# Patient Record
Sex: MALE | Race: WHITE | NOT HISPANIC OR LATINO | Employment: UNEMPLOYED | URBAN - NONMETROPOLITAN AREA
[De-identification: names, ages, dates, MRNs, and addresses within clinical notes are randomized per-mention and may not be internally consistent; named-entity substitution may affect disease eponyms.]

---

## 2021-03-28 ENCOUNTER — HISTORICAL (OUTPATIENT)
Dept: ADMINISTRATIVE | Facility: HOSPITAL | Age: 56
End: 2021-03-28

## 2021-07-18 ENCOUNTER — HISTORICAL (OUTPATIENT)
Dept: ADMINISTRATIVE | Facility: HOSPITAL | Age: 56
End: 2021-07-18

## 2021-08-19 ENCOUNTER — HISTORICAL (OUTPATIENT)
Dept: ADMINISTRATIVE | Facility: HOSPITAL | Age: 56
End: 2021-08-19

## 2023-03-01 ENCOUNTER — HOSPITAL ENCOUNTER (EMERGENCY)
Facility: HOSPITAL | Age: 58
Discharge: HOME | End: 2023-03-01
Payer: MEDICAID

## 2023-03-01 VITALS
OXYGEN SATURATION: 97 % | BODY MASS INDEX: 29.03 KG/M2 | HEIGHT: 73 IN | RESPIRATION RATE: 18 BRPM | SYSTOLIC BLOOD PRESSURE: 99 MMHG | HEART RATE: 65 BPM | WEIGHT: 219 LBS | DIASTOLIC BLOOD PRESSURE: 76 MMHG | TEMPERATURE: 97 F

## 2023-03-01 DIAGNOSIS — R55 SYNCOPE: Primary | ICD-10-CM

## 2023-03-01 DIAGNOSIS — D45 POLYCYTHEMIA VERA: ICD-10-CM

## 2023-03-01 DIAGNOSIS — G93.9 BRAIN LESION: ICD-10-CM

## 2023-03-01 LAB
ALBUMIN SERPL-MCNC: 3.7 G/DL (ref 3.4–5)
ALBUMIN/GLOB SERPL: 1.5 RATIO
ALP SERPL-CCNC: 48 UNIT/L (ref 50–144)
ALT SERPL-CCNC: 17 UNIT/L (ref 1–45)
AMPHET UR QL SCN: NEGATIVE
ANION GAP SERPL CALC-SCNC: 10 MEQ/L (ref 2–13)
APPEARANCE UR: CLEAR
AST SERPL-CCNC: 17 UNIT/L (ref 17–59)
BACTERIA #/AREA URNS AUTO: NORMAL /HPF
BARBITURATE SCN PRESENT UR: NEGATIVE
BASOPHILS # BLD AUTO: 0.06 X10(3)/MCL (ref 0.01–0.08)
BASOPHILS NFR BLD AUTO: 0.6 % (ref 0.1–1.2)
BENZODIAZ UR QL SCN: NEGATIVE
BILIRUB UR QL STRIP.AUTO: NEGATIVE MG/DL
BILIRUBIN DIRECT+TOT PNL SERPL-MCNC: 0.2 MG/DL (ref 0–1)
BUN SERPL-MCNC: 8 MG/DL (ref 7–20)
CALCIUM SERPL-MCNC: 8.3 MG/DL (ref 8.4–10.2)
CANNABINOIDS UR QL SCN: NEGATIVE
CHLORIDE SERPL-SCNC: 106 MMOL/L (ref 98–110)
CO2 SERPL-SCNC: 21 MMOL/L (ref 21–32)
COCAINE UR QL SCN: NEGATIVE
COLOR UR AUTO: YELLOW
CREAT SERPL-MCNC: 0.71 MG/DL (ref 0.66–1.25)
CREAT/UREA NIT SERPL: 11 (ref 12–20)
EOSINOPHIL # BLD AUTO: 0.16 X10(3)/MCL (ref 0.04–0.54)
EOSINOPHIL NFR BLD AUTO: 1.5 % (ref 0.7–7)
ERYTHROCYTE [DISTWIDTH] IN BLOOD BY AUTOMATED COUNT: 12.4 % (ref 11.6–14.4)
ETHANOL BLD-MCNC: 0.11 G/DL
ETHANOL SERPL-MCNC: 110 MG/DL
GFR SERPLBLD CREATININE-BSD FMLA CKD-EPI: >90 MLS/MIN/1.73/M2
GLOBULIN SER-MCNC: 2.4 GM/DL (ref 2–3.9)
GLUCOSE SERPL-MCNC: 96 MG/DL (ref 70–115)
GLUCOSE UR QL STRIP.AUTO: NEGATIVE MG/DL
HCT VFR BLD AUTO: 41.3 % (ref 36–52)
HGB BLD-MCNC: 14.3 G/DL (ref 13–18)
IMM GRANULOCYTES # BLD AUTO: 0.04 X10(3)/MCL (ref 0–0.03)
IMM GRANULOCYTES NFR BLD AUTO: 0.4 % (ref 0–0.5)
KETONES UR QL STRIP.AUTO: NEGATIVE MG/DL
LEUKOCYTE ESTERASE UR QL STRIP.AUTO: NEGATIVE UNIT/L
LYMPHOCYTES # BLD AUTO: 3.13 X10(3)/MCL (ref 1.32–3.57)
LYMPHOCYTES NFR BLD AUTO: 29.1 % (ref 20–55)
MAGNESIUM SERPL-MCNC: 1.7 MG/DL (ref 1.8–2.4)
MCH RBC QN AUTO: 33 PG (ref 27–34)
MCV RBC AUTO: 95.4 FL (ref 79–99)
MEAN CELL HEMOGLOBIN CONCENTRATION (OHS) G/DL: 34.6 G/DL (ref 31–37)
METHADONE UR QL SCN: NEGATIVE
MONOCYTES # BLD AUTO: 0.61 X10(3)/MCL (ref 0.3–0.82)
MONOCYTES NFR BLD AUTO: 5.7 % (ref 4.7–12.5)
NEUTROPHILS # BLD AUTO: 6.74 X10(3)/MCL (ref 1.78–5.38)
NEUTROPHILS NFR BLD AUTO: 62.7 % (ref 37–73)
NITRITE UR QL STRIP.AUTO: POSITIVE
NRBC BLD AUTO-RTO: 0 % (ref 0–1)
OPIATES UR QL SCN: NEGATIVE
PCP UR QL: NEGATIVE
PH UR STRIP.AUTO: 6 [PH]
PH UR: 6 [PH] (ref 3–11)
PLATELET # BLD AUTO: 213 X10(3)/MCL (ref 140–371)
PMV BLD AUTO: 8.8 FL (ref 9.4–12.4)
POTASSIUM SERPL-SCNC: 3.8 MMOL/L (ref 3.5–5.1)
PROT SERPL-MCNC: 6.1 GM/DL (ref 6.3–8.2)
PROT UR QL STRIP.AUTO: NEGATIVE MG/DL
RBC # BLD AUTO: 4.33 X10(6)/MCL (ref 4–6)
RBC #/AREA URNS AUTO: NORMAL /HPF
RBC UR QL AUTO: ABNORMAL UNIT/L
SODIUM SERPL-SCNC: 137 MMOL/L (ref 135–145)
SP GR UR STRIP.AUTO: 1.01
SQUAMOUS #/AREA URNS AUTO: NORMAL /HPF
TROPONIN I SERPL-MCNC: <0.012 NG/ML (ref 0–0.03)
UROBILINOGEN UR STRIP-ACNC: 0.2 MG/DL
VALPROATE SERPL-MCNC: 52.5 UG/ML (ref 50–100)
WBC # SPEC AUTO: 10.7 X10(3)/MCL (ref 4–11.5)
WBC #/AREA URNS AUTO: NORMAL /HPF

## 2023-03-01 PROCEDURE — 83735 ASSAY OF MAGNESIUM: CPT

## 2023-03-01 PROCEDURE — 93005 ELECTROCARDIOGRAM TRACING: CPT

## 2023-03-01 PROCEDURE — 80307 DRUG TEST PRSMV CHEM ANLYZR: CPT

## 2023-03-01 PROCEDURE — 96360 HYDRATION IV INFUSION INIT: CPT

## 2023-03-01 PROCEDURE — 25000003 PHARM REV CODE 250

## 2023-03-01 PROCEDURE — 81003 URINALYSIS AUTO W/O SCOPE: CPT

## 2023-03-01 PROCEDURE — 84484 ASSAY OF TROPONIN QUANT: CPT

## 2023-03-01 PROCEDURE — 93010 ELECTROCARDIOGRAM REPORT: CPT | Mod: ,,, | Performed by: INTERNAL MEDICINE

## 2023-03-01 PROCEDURE — 80053 COMPREHEN METABOLIC PANEL: CPT

## 2023-03-01 PROCEDURE — 36415 COLL VENOUS BLD VENIPUNCTURE: CPT

## 2023-03-01 PROCEDURE — 93010 EKG 12-LEAD: ICD-10-PCS | Mod: ,,, | Performed by: INTERNAL MEDICINE

## 2023-03-01 PROCEDURE — 82077 ASSAY SPEC XCP UR&BREATH IA: CPT

## 2023-03-01 PROCEDURE — 80164 ASSAY DIPROPYLACETIC ACD TOT: CPT

## 2023-03-01 PROCEDURE — 99285 EMERGENCY DEPT VISIT HI MDM: CPT | Mod: 25

## 2023-03-01 PROCEDURE — 85025 COMPLETE CBC W/AUTO DIFF WBC: CPT

## 2023-03-01 RX ORDER — DIVALPROEX SODIUM 500 MG/1
500 TABLET, DELAYED RELEASE ORAL 3 TIMES DAILY
COMMUNITY
Start: 2023-02-09

## 2023-03-01 RX ORDER — SERTRALINE HYDROCHLORIDE 50 MG/1
50 TABLET, FILM COATED ORAL
COMMUNITY
Start: 2023-02-09

## 2023-03-01 RX ADMIN — SODIUM CHLORIDE 1000 ML: 9 INJECTION, SOLUTION INTRAVENOUS at 04:03

## 2023-03-01 NOTE — DISCHARGE INSTRUCTIONS
Evaluation by neurologist and / or neurosurgeon recommended.    P. Vera should be evaluated by a Hematologist / Oncologist. Suggest referral.    Discuss management with your PCP.    Home today, rest with plenty of fluids. Take a break from alcohol.

## 2023-03-01 NOTE — ED PROVIDER NOTES
Encounter Date: 3/1/2023       History     Chief Complaint   Patient presents with    Loss of Consciousness     Etoh on board. Pt was on porch drinking when he woke up to his mother fanning him and calling ems. Pt arrived ems ns bolus      57-year-old male presents via EMS after syncopal episode at home.  He reports he was sitting down, then woke up on the floor.  EMS reports that he was diaphoretic, with a systolic blood pressure in the 60s on arrival.  Patient says he may be dehydrated, as he spends a great deal of maite on the porch, and this has happened to him before.  He drank a 5th of whiskey tonight, and was sitting on the porch when he passed out.  Patient has a history of polycythemia vera, some type of pituitary angioma, and has had a seizure disorder since the age of 16.  He is unemployed due to his epilepsy.  He reports insomnia keeps him up all hours of the night.  He drinks a large amount of alcohol regularly.  He has not been ill lately.  He has not had any diarrhea or vomiting.  His only treatment for his polycythemia is daily aspirin; he does not get phlebotomy.    The history is provided by the patient and the EMS personnel.   Review of patient's allergies indicates:  No Known Allergies  Past Medical History:   Diagnosis Date    COPD (chronic obstructive pulmonary disease)     Seizures      No past surgical history on file.  No family history on file.  Social History     Substance Use Topics    Alcohol use: Yes     Alcohol/week: 9.0 standard drinks     Types: 9 Shots of liquor per week     Review of Systems   Constitutional:  Negative for fever.   HENT:  Negative for congestion.    Respiratory:  Negative for cough, chest tightness and shortness of breath.    Cardiovascular:  Negative for chest pain.   Gastrointestinal:  Negative for blood in stool, diarrhea and nausea.   Neurological:  Positive for syncope and light-headedness. Negative for seizures.   All other systems reviewed and are  negative.    Physical Exam     Initial Vitals [03/01/23 0443]   BP Pulse Resp Temp SpO2   93/74 76 18 97.2 °F (36.2 °C) 97 %      MAP       --         Physical Exam    Nursing note and vitals reviewed.  Constitutional: Vital signs are normal. He appears well-developed and well-nourished. He is cooperative.   Patient is pleasant and freely cooperative.  He smells quite strongly of alcohol.   HENT:   Head: Normocephalic and atraumatic.   Eyes: Conjunctivae, EOM and lids are normal. Pupils are equal, round, and reactive to light.   Neck: Trachea normal. Neck supple.   Cardiovascular:  Normal rate, regular rhythm, normal heart sounds and intact distal pulses.           Pulmonary/Chest: Breath sounds normal.   Abdominal: Abdomen is soft. Bowel sounds are normal. He exhibits no distension. There is no abdominal tenderness. There is no rebound.   Musculoskeletal:         General: Normal range of motion.      Cervical back: Normal and neck supple.      Lumbar back: Normal.     Neurological: He is alert and oriented to person, place, and time. He has normal strength. Coordination normal.   Skin: Skin is warm, dry and intact. Capillary refill takes less than 2 seconds.   Psychiatric: He has a normal mood and affect. His speech is normal and behavior is normal. Judgment and thought content normal. Cognition and memory are normal.       ED Course   Procedures  Labs Reviewed   COMPREHENSIVE METABOLIC PANEL - Abnormal; Notable for the following components:       Result Value    Calcium Level Total 8.3 (*)     Protein Total 6.1 (*)     Alkaline Phosphatase 48 (*)     BUN/Creatinine Ratio 11 (*)     All other components within normal limits   URINALYSIS, REFLEX TO URINE CULTURE - Abnormal; Notable for the following components:    Blood, UA Trace-Intact (*)     Nitrites, UA Positive (*)     All other components within normal limits    Narrative:      URINE STABILITY IS 2 HOURS AT ROOM TEMP OR    SIX HOURS REFRIGERATED. PERFORMING  TESTING ON    SPECIMENS GREATER THAN THIS AGE MAY AFFECT THE    FOLLOWING TESTS:    PH          SPECIFIC GRAVITY           BLOOD    CLARITY     BILIRUBIN               UROBILINOGEN   ALCOHOL,MEDICAL (ETHANOL) - Abnormal; Notable for the following components:    Ethanol Level 110.0 (*)     Alcohol, Legal Level 0.110 (*)     All other components within normal limits   MAGNESIUM - Abnormal; Notable for the following components:    Magnesium Level 1.70 (*)     All other components within normal limits   CBC WITH DIFFERENTIAL - Abnormal; Notable for the following components:    MPV 8.8 (*)     Neut # 6.74 (*)     IG# 0.04 (*)     All other components within normal limits   TROPONIN I - Normal   DRUG SCREEN, URINE (BEAKER) - Normal    Narrative:     Cut off concentrations:    Amphetamines - 1000 ng/ml  Barbiturates - 200 ng/ml  Benzodiazepine - 200 ng/ml  Cannabinoids (THC) - 50 ng/ml  Cocaine - 300 ng/ml  Fentanyl - 1.0 ng/ml  MDMA - 500 ng/ml  Opiates - 300 ng/ml   Phencyclidine (PCP) - 25 ng/ml  Methadone - 300 ng/ml      False negatives may result form substances such as bleach added to urine.  False positives may result for the presence of a substance with similar chemical structure to the drug or its metabolite.    This test provides only a PRELIMINARY analytical test result. A more specific alternate chemical method must be used in order to obtain a confirmed analytical result. Gas chromatography/mass spectrometry (GC/MS) is the preferred confirmatory method. Other chemical confirmation methods are available. Clinical consideration and professional judgement should be applied to any drug of abuse test result, particularly when preliminary positive results are used.    Positive results will be confirmed only at the physicians request. Unconfirmed screening results are to be used only for medical purposes (treatment).          VALPROIC ACID - Normal   URINALYSIS, MICROSCOPIC - Normal   CBC W/ AUTO DIFFERENTIAL     Narrative:     The following orders were created for panel order CBC auto differential.  Procedure                               Abnormality         Status                     ---------                               -----------         ------                     CBC with Differential[[id number]]        Abnormal            Final result                 Please view results for these tests on the individual orders.        ECG Results              EKG 12-lead (Final result)  Result time 03/01/23 18:06:34      Final result by Interface, Lab [facility] (03/01/23 18:06:34)                   Narrative:    Test Reason : R55,    Vent. Rate : 073 BPM     Atrial Rate : 073 BPM     P-R Int : 164 ms          QRS Dur : 082 ms      QT Int : 412 ms       P-R-T Axes : 072 053 052 degrees     QTc Int : 453 ms    Normal sinus rhythm  Normal ECG  No previous ECGs available  Confirmed by Maurizio Vo MD ([id number]) on 3/1/2023 6:06:24 PM    Referred By: [email or web address]   SELF           Confirmed By:Maurizio Vo MD                                  Imaging Results              CT Head Without Contrast (Final result)  Result time 03/01/23 06:00:01      Final result by Mariam Sweeney III, MD (03/01/23 06:00:01)                   Impression:      1. A 1.1 cm, stable, subtle area of increased attenuation is noted within the posterior aspect of the left temporal lobe as seen on prior imaging.  The stability of the findings suggest a benign process such as a hemangioma/vascular malformation, and clinical correlation is recommended.  2. No additional abnormalities are noted.      Electronically signed by: Mariam Sweeney  Date:    03/01/2023  Time:    06:00               Narrative:    EXAMINATION:  STUDY: CT HEAD WITHOUT CONTRAST    CLINICAL HISTORY AND TECHNIQUE:  RT Lesia on 3/1/2023  5:51 AM    PROCEDURE: CT BRAIN WO CONT    CLINICAL HX: ER    SYNCOPAL EPISODE TODAY WHILE DRINKING ETOH ON PORCH    PT WOKE UP DIAPHORETIC, WITH LOW SYSTOLIC BLOOD PRESSURE  (60'S)    PMH: HX OF POLYCYTHEMIA VERA, PITUITARY ANGIOMA (UNSPECIFIED), SEIZURE DISORDER,    DAILY ALCOHOL USE, AND CV-    IV CONTRAST: NONE    ORAL CONTRAST: NONE    RECTAL CONTRAST: NONE    AXIAL IMAGING @ 5MM INTERVALS W/MULTIPLANAR RECONSTRUCTION OF IMAGES    TOTAL IMAGE NUMBER: 37    CTDIvol(mGy): HEAD: 57.5 BODY:    DLP(mGycm): HEAD: 1154.4 BODY:    # CT'S LAST 12 MONTHS:1    TECH: AJR    PT TRANSPORTED W/O INCIDENT    This patient has had 1 CT and nuclear medicine scans performed within the last 12 months.    The following DOSE REDUCTION TECHNIQUES are used for all CT scans at [facility]:    1. Automated exposure control.  2. Adjustment of the mA and/or kv according to patient size.  3. Use of iterative reconstruction technique.    COMPARISON:  03/28/2021    FINDINGS:  Axial imaging through the head/brain was performed. The previously noted, very subtle, 1.1 cm focal area of slightly increased attenuation within the posterior aspect of the left temporal lobe has not changed appreciably when compared to the prior exam of 03/28/2021 (the lesion was demonstrated to be a nonenhancing nodule on postcontrast imaging at that time).  I see intraparenchymal hemorrhage or dominant wedge-shaped infarcts..  I see no extra-axial masses or abnormal fluid collections.                                       Medications   sodium chloride 0.9% bolus 1,000 mL 1,000 mL (0 mLs Intravenous Stopped 3/1/23 0600)     Medical Decision Making:   Initial Assessment:   Syncope and hypotension.  Alcohol intoxication.  History of pituitary angioma, polycythemia vera, epilepsy  Differential Diagnosis:   Dehydration, alcohol intoxication, anemia, seizure, cardiac arrhythmia.  Clinical Tests:   Lab Tests: Ordered  Radiological Study: Ordered  Medical Tests: Ordered  ED Management:  IV fluids for now.  CT head, EKG, labs  Patient turned over to Dr. Paris                        Clinical Impression:   Final  diagnoses:  [R55] Syncope (Primary)  [G93.9] Brain lesion  [D45] Polycythemia vera        ED Disposition Condition    Discharge Stable          ED Prescriptions    None       Follow-up Information       Follow up With Specialties Details Why Contact Info    John Tam MD Pediatrics Schedule an appointment as soon as possible for a visit   [address]  [address]  [facility] [address]  [phone]               Jong Baker MD  03/03/23 7890

## 2023-03-01 NOTE — ED PROVIDER NOTES
Encounter Date: 3/1/2023       History     Chief Complaint   Patient presents with    Loss of Consciousness     Etoh on board. Pt was on porch drinking when he woke up to his mother fanning him and calling ems. Pt arrived ems ns bolus      [employer] of care note:    Pt is a 57 y.o. male complaining of syncope. Their evaluaion was initiated by previous ER physician and turned over to my care at shift change. I have examined the patient and agree with previous documentation.    He is feeling back to normal with no complaints. CT ok, labs with alcohol level >100.    Freddie Paris MD  8:11 AM 03/01/2023       Review of patient's allergies indicates:  No Known Allergies  Past Medical History:   Diagnosis Date    COPD (chronic obstructive pulmonary disease)     Seizures      No past surgical history on file.  No family history on file.  Social History     Substance Use Topics    Alcohol use: Yes     Alcohol/week: 9.0 standard drinks     Types: 9 Shots of liquor per week     Review of Systems    Physical Exam     Initial Vitals [03/01/23 0443]   BP Pulse Resp Temp SpO2   93/74 76 18 97.2 °F (36.2 °C) 97 %      MAP       --         Physical Exam    ED Course   Procedures  Labs Reviewed   COMPREHENSIVE METABOLIC PANEL - Abnormal; Notable for the following components:       Result Value    Calcium Level Total 8.3 (*)     Protein Total 6.1 (*)     Alkaline Phosphatase 48 (*)     BUN/Creatinine Ratio 11 (*)     All other components within normal limits   URINALYSIS, REFLEX TO URINE CULTURE - Abnormal; Notable for the following components:    Blood, UA Trace-Intact (*)     Nitrites, UA Positive (*)     All other components within normal limits    Narrative:      URINE STABILITY IS 2 HOURS AT ROOM TEMP OR    SIX HOURS REFRIGERATED. PERFORMING TESTING ON    SPECIMENS GREATER THAN THIS AGE MAY AFFECT THE    FOLLOWING TESTS:    PH          SPECIFIC GRAVITY           BLOOD    CLARITY     BILIRUBIN               UROBILINOGEN    ALCOHOL,MEDICAL (ETHANOL) - Abnormal; Notable for the following components:    Ethanol Level 110.0 (*)     Alcohol, Legal Level 0.110 (*)     All other components within normal limits   MAGNESIUM - Abnormal; Notable for the following components:    Magnesium Level 1.70 (*)     All other components within normal limits   CBC WITH DIFFERENTIAL - Abnormal; Notable for the following components:    MPV 8.8 (*)     Neut # 6.74 (*)     IG# 0.04 (*)     All other components within normal limits   TROPONIN I - Normal   DRUG SCREEN, URINE (BEAKER) - Normal    Narrative:     Cut off concentrations:    Amphetamines - 1000 ng/ml  Barbiturates - 200 ng/ml  Benzodiazepine - 200 ng/ml  Cannabinoids (THC) - 50 ng/ml  Cocaine - 300 ng/ml  Fentanyl - 1.0 ng/ml  MDMA - 500 ng/ml  Opiates - 300 ng/ml   Phencyclidine (PCP) - 25 ng/ml  Methadone - 300 ng/ml      False negatives may result form substances such as bleach added to urine.  False positives may result for the presence of a substance with similar chemical structure to the drug or its metabolite.    This test provides only a PRELIMINARY analytical test result. A more specific alternate chemical method must be used in order to obtain a confirmed analytical result. Gas chromatography/mass spectrometry (GC/MS) is the preferred confirmatory method. Other chemical confirmation methods are available. Clinical consideration and professional judgement should be applied to any drug of abuse test result, particularly when preliminary positive results are used.    Positive results will be confirmed only at the physicians request. Unconfirmed screening results are to be used only for medical purposes (treatment).          VALPROIC ACID - Normal   URINALYSIS, MICROSCOPIC - Normal   CBC W/ AUTO DIFFERENTIAL    Narrative:     The following orders were created for panel order CBC auto differential.  Procedure                               Abnormality         Status                      ---------                               -----------         ------                     CBC with Differential[[id number]]        Abnormal            Final result                 Please view results for these tests on the individual orders.          Imaging Results              CT Head Without Contrast (Final result)  Result time 03/01/23 06:00:01      Final result by Mariam Sweeney III, MD (03/01/23 06:00:01)                   Impression:      1. A 1.1 cm, stable, subtle area of increased attenuation is noted within the posterior aspect of the left temporal lobe as seen on prior imaging.  The stability of the findings suggest a benign process such as a hemangioma/vascular malformation, and clinical correlation is recommended.  2. No additional abnormalities are noted.      Electronically signed by: Mariam Sweeney  Date:    03/01/2023  Time:    06:00               Narrative:    EXAMINATION:  STUDY: CT HEAD WITHOUT CONTRAST    CLINICAL HISTORY AND TECHNIQUE:  Jaxson Espinal RT on 3/1/2023  5:51 AM    PROCEDURE: CT BRAIN WO CONT    CLINICAL HX: ER    SYNCOPAL EPISODE TODAY WHILE DRINKING ETOH ON PORCH    PT WOKE UP DIAPHORETIC, WITH LOW SYSTOLIC BLOOD PRESSURE (60'S)    PMH: HX OF POLYCYTHEMIA VERA, PITUITARY ANGIOMA (UNSPECIFIED), SEIZURE DISORDER,    DAILY ALCOHOL USE, AND CV-    IV CONTRAST: NONE    ORAL CONTRAST: NONE    RECTAL CONTRAST: NONE    AXIAL IMAGING @ 5MM INTERVALS W/MULTIPLANAR RECONSTRUCTION OF IMAGES    TOTAL IMAGE NUMBER: 37    CTDIvol(mGy): HEAD: 57.5 BODY:    DLP(mGycm): HEAD: 1154.4 BODY:    # CT'S LAST 12 MONTHS:1    TECH: LEONIER    PT TRANSPORTED W/O INCIDENT    This patient has had 1 CT and nuclear medicine scans performed within the last 12 months.    The following DOSE REDUCTION TECHNIQUES are used for all CT scans at [facility]:    1. Automated exposure control.  2. Adjustment of the mA and/or kv according to patient size.  3. Use of iterative reconstruction  technique.    COMPARISON:  03/28/2021    FINDINGS:  Axial imaging through the head/brain was performed. The previously noted, very subtle, 1.1 cm focal area of slightly increased attenuation within the posterior aspect of the left temporal lobe has not changed appreciably when compared to the prior exam of 03/28/2021 (the lesion was demonstrated to be a nonenhancing nodule on postcontrast imaging at that time).  I see intraparenchymal hemorrhage or dominant wedge-shaped infarcts..  I see no extra-axial masses or abnormal fluid collections.                                       Medications   sodium chloride 0.9% bolus 1,000 mL 1,000 mL (0 mLs Intravenous Stopped 3/1/23 0600)                              Clinical Impression:   Final diagnoses:  [R55] Syncope (Primary)  [G93.9] Brain lesion  [D45] Polycythemia vera        ED Disposition Condition    Discharge Stable          ED Prescriptions    None       Follow-up Information       Follow up With Specialties Details Why Contact Info    John Tam MD Pediatrics Schedule an appointment as soon as possible for a visit   [address]  [address]  [facility] [address]  [phone]               Freddie Paris MD  03/01/23 0815

## 2023-03-26 ENCOUNTER — HOSPITAL ENCOUNTER (EMERGENCY)
Facility: HOSPITAL | Age: 58
Discharge: HOME | End: 2023-03-26
Attending: FAMILY MEDICINE
Payer: MEDICAID

## 2023-03-26 VITALS
RESPIRATION RATE: 18 BRPM | BODY MASS INDEX: 27.83 KG/M2 | SYSTOLIC BLOOD PRESSURE: 135 MMHG | DIASTOLIC BLOOD PRESSURE: 90 MMHG | HEART RATE: 91 BPM | HEIGHT: 73 IN | WEIGHT: 210 LBS | TEMPERATURE: 98 F

## 2023-03-26 DIAGNOSIS — R10.9 ABDOMINAL PAIN: ICD-10-CM

## 2023-03-26 DIAGNOSIS — N39.0 URINARY TRACT INFECTION WITHOUT HEMATURIA, SITE UNSPECIFIED: Primary | ICD-10-CM

## 2023-03-26 LAB
ALBUMIN SERPL-MCNC: 4.7 G/DL (ref 3.4–5)
ALBUMIN/GLOB SERPL: 1.4 RATIO
ALP SERPL-CCNC: 43 UNIT/L (ref 50–144)
ALT SERPL-CCNC: 22 UNIT/L (ref 1–45)
ANION GAP SERPL CALC-SCNC: 7 MEQ/L (ref 2–13)
APPEARANCE UR: CLEAR
AST SERPL-CCNC: 37 UNIT/L (ref 17–59)
BACTERIA #/AREA URNS AUTO: ABNORMAL /HPF
BASOPHILS # BLD AUTO: 0.11 X10(3)/MCL (ref 0.01–0.08)
BASOPHILS NFR BLD AUTO: 1 % (ref 0.1–1.2)
BILIRUB UR QL STRIP.AUTO: NEGATIVE MG/DL
BILIRUBIN DIRECT+TOT PNL SERPL-MCNC: 1.1 MG/DL (ref 0–1)
BUN SERPL-MCNC: 10 MG/DL (ref 7–20)
CALCIUM SERPL-MCNC: 10.3 MG/DL (ref 8.4–10.2)
CHLORIDE SERPL-SCNC: 105 MMOL/L (ref 98–110)
CO2 SERPL-SCNC: 24 MMOL/L (ref 21–32)
COLOR UR AUTO: ABNORMAL
CREAT SERPL-MCNC: 0.95 MG/DL (ref 0.66–1.25)
CREAT/UREA NIT SERPL: 11 (ref 12–20)
EOSINOPHIL # BLD AUTO: 0.21 X10(3)/MCL (ref 0.04–0.54)
EOSINOPHIL NFR BLD AUTO: 1.9 % (ref 0.7–7)
ERYTHROCYTE [DISTWIDTH] IN BLOOD BY AUTOMATED COUNT: 12.7 % (ref 11.6–14.4)
GFR SERPLBLD CREATININE-BSD FMLA CKD-EPI: >90 MLS/MIN/1.73/M2
GLOBULIN SER-MCNC: 3.3 GM/DL (ref 2–3.9)
GLUCOSE SERPL-MCNC: 97 MG/DL (ref 70–115)
GLUCOSE UR QL STRIP.AUTO: 100 MG/DL
HCT VFR BLD AUTO: 46.9 % (ref 36–52)
HGB BLD-MCNC: 16.1 G/DL (ref 13–18)
IMM GRANULOCYTES # BLD AUTO: 0.02 X10(3)/MCL (ref 0–0.03)
IMM GRANULOCYTES NFR BLD AUTO: 0.2 % (ref 0–0.5)
KETONES UR QL STRIP.AUTO: NEGATIVE MG/DL
LEUKOCYTE ESTERASE UR QL STRIP.AUTO: ABNORMAL UNIT/L
LIPASE SERPL-CCNC: 195 U/L (ref 23–300)
LYMPHOCYTES # BLD AUTO: 3.76 X10(3)/MCL (ref 1.32–3.57)
LYMPHOCYTES NFR BLD AUTO: 34.5 % (ref 20–55)
MCH RBC QN AUTO: 32.5 PG (ref 27–34)
MCV RBC AUTO: 94.7 FL (ref 79–99)
MEAN CELL HEMOGLOBIN CONCENTRATION (OHS) G/DL: 34.3 G/DL (ref 31–37)
MONOCYTES # BLD AUTO: 0.57 X10(3)/MCL (ref 0.3–0.82)
MONOCYTES NFR BLD AUTO: 5.2 % (ref 4.7–12.5)
NEUTROPHILS # BLD AUTO: 6.24 X10(3)/MCL (ref 1.78–5.38)
NEUTROPHILS NFR BLD AUTO: 57.2 % (ref 37–73)
NITRITE UR QL STRIP.AUTO: POSITIVE
NRBC BLD AUTO-RTO: 0 % (ref 0–1)
PH UR STRIP.AUTO: 6.5 [PH]
PLATELET # BLD AUTO: 221 X10(3)/MCL (ref 140–371)
PMV BLD AUTO: 9.9 FL (ref 9.4–12.4)
POTASSIUM SERPL-SCNC: 4.3 MMOL/L (ref 3.5–5.1)
PROT SERPL-MCNC: 8 GM/DL (ref 6.3–8.2)
PROT UR QL STRIP.AUTO: NEGATIVE MG/DL
RBC # BLD AUTO: 4.95 X10(6)/MCL (ref 4–6)
RBC #/AREA URNS AUTO: ABNORMAL /HPF
RBC UR QL AUTO: ABNORMAL UNIT/L
SODIUM SERPL-SCNC: 136 MMOL/L (ref 135–145)
SP GR UR STRIP.AUTO: <=1.005
SQUAMOUS #/AREA URNS AUTO: ABNORMAL /HPF
UROBILINOGEN UR STRIP-ACNC: 1 MG/DL
WBC # SPEC AUTO: 10.9 X10(3)/MCL (ref 4–11.5)
WBC #/AREA URNS AUTO: ABNORMAL /HPF

## 2023-03-26 PROCEDURE — 99285 EMERGENCY DEPT VISIT HI MDM: CPT | Mod: 25

## 2023-03-26 PROCEDURE — 25000003 PHARM REV CODE 250: Performed by: FAMILY MEDICINE

## 2023-03-26 PROCEDURE — 80053 COMPREHEN METABOLIC PANEL: CPT | Performed by: FAMILY MEDICINE

## 2023-03-26 PROCEDURE — 83690 ASSAY OF LIPASE: CPT | Performed by: FAMILY MEDICINE

## 2023-03-26 PROCEDURE — 81001 URINALYSIS AUTO W/SCOPE: CPT | Performed by: FAMILY MEDICINE

## 2023-03-26 PROCEDURE — 87088 URINE BACTERIA CULTURE: CPT | Performed by: FAMILY MEDICINE

## 2023-03-26 PROCEDURE — 36415 COLL VENOUS BLD VENIPUNCTURE: CPT | Performed by: FAMILY MEDICINE

## 2023-03-26 PROCEDURE — 96365 THER/PROPH/DIAG IV INF INIT: CPT

## 2023-03-26 PROCEDURE — 63600175 PHARM REV CODE 636 W HCPCS: Performed by: FAMILY MEDICINE

## 2023-03-26 PROCEDURE — 85025 COMPLETE CBC W/AUTO DIFF WBC: CPT | Performed by: FAMILY MEDICINE

## 2023-03-26 RX ORDER — CIPROFLOXACIN 500 MG/1
500 TABLET ORAL 2 TIMES DAILY
Qty: 14 TABLET | Refills: 0 | Status: SHIPPED | OUTPATIENT
Start: 2023-03-26 | End: 2023-04-02

## 2023-03-26 RX ORDER — ONDANSETRON 4 MG/1
4 TABLET, ORALLY DISINTEGRATING ORAL EVERY 6 HOURS PRN
Qty: 10 TABLET | Refills: 0 | Status: SHIPPED | OUTPATIENT
Start: 2023-03-26

## 2023-03-26 RX ADMIN — CEFTRIAXONE SODIUM 1 G: 1 INJECTION, POWDER, FOR SOLUTION INTRAMUSCULAR; INTRAVENOUS at 09:03

## 2023-03-27 NOTE — ED PROVIDER NOTES
Encounter Date: 3/26/2023       History     Chief Complaint   Patient presents with    Abdominal Pain     C/O LEFT TO RIGHT SIDED FLANK PAIN INTO LOWER ABDOMEN     Patient presents to the emergency room with periumbilical pain and nausea that has been going on for at least 2 months.  So the patient called EMS for transport to the emergency room.    The history is provided by the patient.   Review of patient's allergies indicates:  No Known Allergies  Past Medical History:   Diagnosis Date    COPD (chronic obstructive pulmonary disease)     Seizures      No past surgical history on file.  No family history on file.  Social History     Substance Use Topics    Alcohol use: Yes     Alcohol/week: 9.0 standard drinks     Types: 9 Shots of liquor per week     Review of Systems   Constitutional:  Positive for fatigue. Negative for fever.   HENT: Negative.  Negative for sore throat.    Respiratory: Negative.  Negative for shortness of breath.    Cardiovascular:  Negative for chest pain.   Gastrointestinal:  Positive for abdominal pain and nausea. Negative for anal bleeding, constipation, diarrhea and vomiting.   Endocrine: Negative.    Genitourinary: Negative.  Negative for dysuria.   Musculoskeletal:  Negative for back pain.   Skin:  Negative for rash.   Neurological: Negative.  Negative for weakness.   Hematological:  Does not bruise/bleed easily.   All other systems reviewed and are negative.    Physical Exam     Initial Vitals [03/26/23 2040]   BP Pulse Resp Temp SpO2   (!) 135/90 91 18 97.5 °F (36.4 °C) --      MAP       --         Physical Exam    Nursing note and vitals reviewed.  Constitutional: Vital signs are normal. He appears well-developed and well-nourished. He is cooperative.  Non-toxic appearance. He does not appear ill.   HENT:   Head: Normocephalic and atraumatic.   Eyes: Conjunctivae and lids are normal.   Neck: Trachea normal. Neck supple.   Cardiovascular:  Normal rate and regular rhythm.  No  "extrasystoles are present.          Pulmonary/Chest: Breath sounds normal.   Abdominal: Abdomen is soft. Bowel sounds are normal. There is abdominal tenderness.   Very mild periumbilical tenderness no rebound or guarding.  There is no lateralization to the tenderness.  Of note, patient says it will hurt on the right or the left flank "sometimes".   Musculoskeletal:         General: Normal range of motion.      Cervical back: Neck supple.     Neurological: He is alert and oriented to person, place, and time. He has normal strength. No cranial nerve deficit or sensory deficit. He displays a negative Romberg sign.   Skin: Skin is warm, dry and intact. Capillary refill takes less than 2 seconds.   Psychiatric: He has a normal mood and affect. His speech is normal and behavior is normal. He is not actively hallucinating. He is attentive.       ED Course   Procedures  Labs Reviewed   COMPREHENSIVE METABOLIC PANEL - Abnormal; Notable for the following components:       Result Value    Calcium Level Total 10.3 (*)     Bilirubin Total 1.1 (*)     Alkaline Phosphatase 43 (*)     BUN/Creatinine Ratio 11 (*)     All other components within normal limits   URINALYSIS - Abnormal; Notable for the following components:    Color, UA Orange (*)     Glucose,  (*)     Blood, UA Small (*)     Nitrites, UA Positive (*)     Leukocyte Esterase, UA Trace (*)     All other components within normal limits    Narrative:      URINE STABILITY IS 2 HOURS AT ROOM TEMP OR    SIX HOURS REFRIGERATED. PERFORMING TESTING ON    SPECIMENS GREATER THAN THIS AGE MAY AFFECT THE    FOLLOWING TESTS:    PH          SPECIFIC GRAVITY           BLOOD    CLARITY     BILIRUBIN               UROBILINOGEN   CBC WITH DIFFERENTIAL - Abnormal; Notable for the following components:    Lymph # 3.76 (*)     Neut # 6.24 (*)     Baso # 0.11 (*)     All other components within normal limits   URINALYSIS, MICROSCOPIC - Abnormal; Notable for the following components:    " Bacteria, UA 1+ (*)     All other components within normal limits   LIPASE - Normal   CULTURE, URINE   CBC W/ AUTO DIFFERENTIAL    Narrative:     The following orders were created for panel order CBC Auto Differential.  Procedure                               Abnormality         Status                     ---------                               -----------         ------                     CBC with Differential[[id number]]        Abnormal            Final result                 Please view results for these tests on the individual orders.          Imaging Results              X-Ray Chest AP Portable (Preliminary result)  Result time 03/26/23 21:39:50      Wet Read by Freddie Paris MD (03/26/23 21:39:50, [facility], Emergency Medicine)    Right lung atelectasis                                     CT Abdomen Pelvis  Without Contrast (Final result)  Result time 03/26/23 22:16:02      Final result by Remberto Hare MD (03/26/23 22:16:02)                   Impression:      No acute process is identified.  Recommend follow-up if symptoms persist.    Normal appendix.                All CT scans at [this location] are performed using dose modulation techniques as appropriate to a performed exam including the following: automated exposure control; adjustment of the mA and/or kV according to patient size (this includes techniques or standardized protocols for targeted exams where dose is matched to indication / reason for exam; i.e. extremities or head); use of iterative reconstruction technique.    Finalized on: 3/26/2023 10:16 PM By:  Remberto Hare MD KIM PhD  [facility]# [id number]      2023-03-26 22:18:16.079    [facility]               Narrative:    EXAM: CT ABDOMEN PELVIS WITHOUT CONTRAST    CLINICAL HISTORY: Pain    COMPARISON STUDIES: None    TECHNIQUE: CT scan performed of the Abdomen pelvis without IV contrast.  Multiplanar reformats as well as dose reduction optimization technique were  performed.      FINDINGS:    Suboptimal imaging due to lack of contrast    Lung Bases:  The lung bases are clear.    Liver:  No contour deformity.  Hepatomegaly    Spleen:  No contour deformity.    Pancreas:  No contour deformity.    Adrenals:  The adrenals appear normal.    Kidneys:  No contour deformity. No calculi or hydronephrosis.    Gallbladder:  The gallbladder appears normal with no opaque gallstones evident.    Bowel:  No bowel obstruction.  No secondary signs of appendicitis..    Retroperitoneum:  Unremarkable.    Abdominal Wall:  Unremarkable.    Bladder:  The bladder appears normal.     System:  Genitourinary structures are unremarkable..    Bones:  The bones are unremarkable for age..    Vasculature:  Negative for aortic aneurysm and unremarkable for stated age.     .                                         Medications   cefTRIAXone (ROCEPHIN) 1 g in dextrose 5 % in water (D5W) 5 % 50 mL IVPB (MB+) (0 g Intravenous Stopped 3/26/23 2218)     Medical Decision Making:   Initial Assessment:   Abdominal pain and nausea  Differential Diagnosis:   Appendicitis, kidney stones, small-bowel obstruction, urinary tract infection  Clinical Tests:   Lab Tests: Ordered and Reviewed  Radiological Study: Ordered and Reviewed  Medical Tests: Ordered and Reviewed  ED Management:  IV Rocephin given in the emergency room, we will discharge                        Clinical Impression:   Final diagnoses:  [R10.9] Abdominal pain  [N39.0] Urinary tract infection without hematuria, site unspecified (Primary)        ED Disposition Condition    Discharge Stable          ED Prescriptions       Medication Sig Dispense Start Date End Date Auth. Provider    ciprofloxacin HCl (CIPRO) 500 MG tablet Take 1 tablet (500 mg total) by mouth 2 (two) times a day. for 7 days 14 tablet 3/26/2023 4/2/2023 Freddie Paris MD    ondansetron (ZOFRAN-ODT) 4 MG TbDL Take 1 tablet (4 mg total) by mouth every 6 (six) hours as needed (nausea). 10 tablet  3/26/2023 -- Freddie Paris MD          Follow-up Information       Follow up With Specialties Details Why Contact Info    John Tam MD Pediatrics  If symptoms worsen [address]  [facility] [address]  [phone]               Freddie Paris MD  03/27/23 0125

## 2023-03-29 LAB — BACTERIA UR CULT: NO GROWTH

## 2025-02-05 ENCOUNTER — HOSPITAL ENCOUNTER (EMERGENCY)
Facility: HOSPITAL | Age: 60
Discharge: HOME | End: 2025-02-05
Payer: MEDICAID

## 2025-02-05 VITALS
HEIGHT: 73 IN | TEMPERATURE: 98 F | WEIGHT: 221 LBS | DIASTOLIC BLOOD PRESSURE: 98 MMHG | HEART RATE: 71 BPM | RESPIRATION RATE: 16 BRPM | BODY MASS INDEX: 29.29 KG/M2 | SYSTOLIC BLOOD PRESSURE: 138 MMHG | OXYGEN SATURATION: 100 %

## 2025-02-05 DIAGNOSIS — R06.02 SHORTNESS OF BREATH: ICD-10-CM

## 2025-02-05 DIAGNOSIS — J44.1 COPD EXACERBATION: Primary | ICD-10-CM

## 2025-02-05 LAB
ABS NEUT CALC (OHS): 4.25 X10(3)/MCL (ref 2.1–9.2)
ALBUMIN SERPL-MCNC: 4.4 G/DL (ref 3.4–5)
ALBUMIN/GLOB SERPL: 1.8 RATIO
ALP SERPL-CCNC: 61 UNIT/L (ref 50–144)
ALT SERPL-CCNC: 16 UNIT/L (ref 1–45)
ANION GAP SERPL CALC-SCNC: 7 MEQ/L (ref 2–13)
ANISOCYTOSIS BLD QL SMEAR: SLIGHT
AST SERPL-CCNC: 21 UNIT/L (ref 17–59)
BILIRUB SERPL-MCNC: 0.6 MG/DL (ref 0–1)
BNP BLD-MCNC: 34.4 PG/ML (ref 0–124.9)
BUN SERPL-MCNC: 14 MG/DL (ref 7–20)
CALCIUM SERPL-MCNC: 9.7 MG/DL (ref 8.4–10.2)
CHLORIDE SERPL-SCNC: 105 MMOL/L (ref 98–110)
CO2 SERPL-SCNC: 25 MMOL/L (ref 21–32)
CREAT SERPL-MCNC: 0.92 MG/DL (ref 0.66–1.25)
CREAT/UREA NIT SERPL: 15 (ref 12–20)
D DIMER PPP IA.FEU-MCNC: 0.33 MG/L (ref 0.19–0.5)
EOSINOPHIL NFR BLD MANUAL: 0.1 X10(3)/MCL (ref 0–0.9)
EOSINOPHIL NFR BLD MANUAL: 1 % (ref 0–3)
ERYTHROCYTE [DISTWIDTH] IN BLOOD BY AUTOMATED COUNT: 12.9 %
ETHANOL BLD-MCNC: <0.01 G/DL
ETHANOL SERPL-MCNC: <10 MG/DL
GFR SERPLBLD CREATININE-BSD FMLA CKD-EPI: >90 ML/MIN/1.73/M2
GLOBULIN SER-MCNC: 2.5 GM/DL (ref 2–3.9)
GLUCOSE SERPL-MCNC: 91 MG/DL (ref 70–115)
HCT VFR BLD AUTO: 46.3 % (ref 36–52)
HGB BLD-MCNC: 16 G/DL (ref 13–18)
LYMPH ABN # BLD MANUAL: 7 %
LYMPHOCYTES NFR BLD MANUAL: 4.66 X10(3)/MCL
LYMPHOCYTES NFR BLD MANUAL: 46 % (ref 20–55)
MACROCYTES BLD QL SMEAR: SLIGHT
MCH RBC QN AUTO: 33.5 PG (ref 27–34)
MCHC RBC AUTO-ENTMCNC: 34.6 G/DL (ref 31–37)
MCV RBC AUTO: 96.9 FL (ref 79–99)
MONOCYTES NFR BLD MANUAL: 0.4 X10(3)/MCL (ref 0.1–1.3)
MONOCYTES NFR BLD MANUAL: 4 % (ref 0–10)
NEUTROPHILS NFR BLD MANUAL: 42 % (ref 37–73)
NRBC BLD AUTO-RTO: 0 %
OHS QRS DURATION: 82 MS
OHS QTC CALCULATION: 442 MS
PLATELET # BLD AUTO: 206 X10(3)/MCL (ref 140–371)
PLATELET # BLD EST: ADEQUATE 10*3/UL
PMV BLD AUTO: 9 FL (ref 9.4–12.4)
POLYCHROMASIA BLD QL SMEAR: SLIGHT
POTASSIUM SERPL-SCNC: 3.8 MMOL/L (ref 3.5–5.1)
PROT SERPL-MCNC: 6.9 GM/DL (ref 6.3–8.2)
RBC # BLD AUTO: 4.78 X10(6)/MCL (ref 4–6)
SARS-COV-2 RDRP RESP QL NAA+PROBE: NEGATIVE
SODIUM SERPL-SCNC: 137 MMOL/L (ref 136–145)
STOMATOCYTES (OLG): SLIGHT
TROPONIN I SERPL-MCNC: <0.012 NG/ML (ref 0–0.03)
WBC # BLD AUTO: 10.12 X10(3)/MCL (ref 4–11.5)

## 2025-02-05 PROCEDURE — 82077 ASSAY SPEC XCP UR&BREATH IA: CPT

## 2025-02-05 PROCEDURE — 94640 AIRWAY INHALATION TREATMENT: CPT | Mod: XB

## 2025-02-05 PROCEDURE — 84484 ASSAY OF TROPONIN QUANT: CPT

## 2025-02-05 PROCEDURE — 85379 FIBRIN DEGRADATION QUANT: CPT

## 2025-02-05 PROCEDURE — U0002 COVID-19 LAB TEST NON-CDC: HCPCS

## 2025-02-05 PROCEDURE — 80053 COMPREHEN METABOLIC PANEL: CPT

## 2025-02-05 PROCEDURE — 63600175 PHARM REV CODE 636 W HCPCS

## 2025-02-05 PROCEDURE — 99285 EMERGENCY DEPT VISIT HI MDM: CPT | Mod: 25

## 2025-02-05 PROCEDURE — 93005 ELECTROCARDIOGRAM TRACING: CPT

## 2025-02-05 PROCEDURE — 25000242 PHARM REV CODE 250 ALT 637 W/ HCPCS: Performed by: INTERNAL MEDICINE

## 2025-02-05 PROCEDURE — 94761 N-INVAS EAR/PLS OXIMETRY MLT: CPT

## 2025-02-05 PROCEDURE — 96374 THER/PROPH/DIAG INJ IV PUSH: CPT

## 2025-02-05 PROCEDURE — 93010 ELECTROCARDIOGRAM REPORT: CPT | Mod: ,,, | Performed by: INTERNAL MEDICINE

## 2025-02-05 PROCEDURE — 85025 COMPLETE CBC W/AUTO DIFF WBC: CPT

## 2025-02-05 PROCEDURE — 83880 ASSAY OF NATRIURETIC PEPTIDE: CPT

## 2025-02-05 RX ORDER — IPRATROPIUM BROMIDE AND ALBUTEROL SULFATE 2.5; .5 MG/3ML; MG/3ML
3 SOLUTION RESPIRATORY (INHALATION)
Status: COMPLETED | OUTPATIENT
Start: 2025-02-05 | End: 2025-02-05

## 2025-02-05 RX ORDER — BUDESONIDE 0.5 MG/2ML
0.5 INHALANT ORAL ONCE
Status: COMPLETED | OUTPATIENT
Start: 2025-02-05 | End: 2025-02-05

## 2025-02-05 RX ORDER — METHYLPREDNISOLONE 4 MG/1
TABLET ORAL
Qty: 21 EACH | Refills: 0 | Status: SHIPPED | OUTPATIENT
Start: 2025-02-05 | End: 2025-02-26

## 2025-02-05 RX ORDER — ALBUTEROL SULFATE 90 UG/1
2 INHALANT RESPIRATORY (INHALATION) EVERY 6 HOURS PRN
Qty: 18 G | Refills: 0 | Status: SHIPPED | OUTPATIENT
Start: 2025-02-05 | End: 2026-02-05

## 2025-02-05 RX ORDER — DEXAMETHASONE SODIUM PHOSPHATE 4 MG/ML
12 INJECTION, SOLUTION INTRA-ARTICULAR; INTRALESIONAL; INTRAMUSCULAR; INTRAVENOUS; SOFT TISSUE
Status: COMPLETED | OUTPATIENT
Start: 2025-02-05 | End: 2025-02-05

## 2025-02-05 RX ORDER — IPRATROPIUM BROMIDE AND ALBUTEROL SULFATE 2.5; .5 MG/3ML; MG/3ML
3 SOLUTION RESPIRATORY (INHALATION) EVERY 6 HOURS PRN
Qty: 75 ML | Refills: 0 | Status: SHIPPED | OUTPATIENT
Start: 2025-02-05 | End: 2026-02-05

## 2025-02-05 RX ADMIN — DEXAMETHASONE SODIUM PHOSPHATE 12 MG: 4 INJECTION, SOLUTION INTRA-ARTICULAR; INTRALESIONAL; INTRAMUSCULAR; INTRAVENOUS; SOFT TISSUE at 04:02

## 2025-02-05 RX ADMIN — IPRATROPIUM BROMIDE AND ALBUTEROL SULFATE 3 ML: .5; 3 SOLUTION RESPIRATORY (INHALATION) at 06:02

## 2025-02-05 RX ADMIN — BUDESONIDE INHALATION 0.5 MG: 0.5 SUSPENSION RESPIRATORY (INHALATION) at 06:02

## 2025-02-05 NOTE — ED PROVIDER NOTES
Encounter Date: 2/5/2025       History     Chief Complaint   Patient presents with    Shortness of Breath     WORSENING OF SOB     59-year-old male arrives via EMS with complaints of shortness of breath.  He has been having some orthopnea the last few nights.  Tonight he got up, went to the restroom, then suddenly became short of breath and diaphoretic.  He denies any chest pain or vomiting.  There has been no fever.  He has a history of COPD and a seizure disorder.  He has never had congestive heart failure.  He has a chronically swollen left leg.  He still smokes.  He drinks about a pint of whiskey a day; but does not get withdrawal symptoms when he stops.    The history is provided by the patient, medical records and the EMS personnel.     Review of patient's allergies indicates:  No Known Allergies  Past Medical History:   Diagnosis Date    Brain tumor     COPD (chronic obstructive pulmonary disease)     Seizures      History reviewed. No pertinent surgical history.  No family history on file.  Social History     Tobacco Use    Smoking status: Every Day     Current packs/day: 2.00     Types: Cigarettes    Smokeless tobacco: Never   Substance Use Topics    Alcohol use: Yes     Alcohol/week: 9.0 standard drinks of alcohol     Types: 9 Shots of liquor per week    Drug use: Never     Review of Systems   Constitutional:  Positive for diaphoresis. Negative for fever.   HENT:  Negative for sore throat.    Respiratory:  Positive for cough and shortness of breath.    Cardiovascular:  Negative for chest pain.   Gastrointestinal:  Negative for nausea.   Genitourinary:  Negative for dysuria.   Musculoskeletal:  Negative for back pain.   Skin:  Negative for rash.   Neurological:  Negative for weakness.   Hematological:  Does not bruise/bleed easily.   All other systems reviewed and are negative.      Physical Exam     Initial Vitals [02/05/25 0357]   BP Pulse Resp Temp SpO2   (!) 159/103 91 (!) 22 97.7 °F (36.5 °C) 100 %       MAP       --         Physical Exam    Nursing note and vitals reviewed.  Constitutional: Vital signs are normal. He appears well-developed and well-nourished. He is cooperative.   HENT:   Head: Normocephalic and atraumatic. Mouth/Throat: Oropharynx is clear and moist.   Eyes: Conjunctivae, EOM and lids are normal. Pupils are equal, round, and reactive to light.   Neck: Trachea normal. Neck supple.   Normal range of motion.  Cardiovascular:  Normal rate, regular rhythm, normal heart sounds and intact distal pulses.           Pulmonary/Chest: He has wheezes.   Decreased air movement throughout   Abdominal: Abdomen is soft. Bowel sounds are normal.   Musculoskeletal:         General: Edema present. Normal range of motion.      Cervical back: Normal, normal range of motion and neck supple.      Lumbar back: Normal.      Comments: 3+ pedal edema bilaterally, although the left ankle is larger than the right.     Neurological: He is alert and oriented to person, place, and time. He has normal strength. Coordination normal. GCS score is 15. GCS eye subscore is 4. GCS verbal subscore is 5. GCS motor subscore is 6.   Skin: Skin is warm, dry and intact. Capillary refill takes less than 2 seconds.   Psychiatric: He has a normal mood and affect. His speech is normal and behavior is normal. Judgment and thought content normal. Cognition and memory are normal.       ED Course   Procedures  Labs Reviewed   CBC WITH DIFFERENTIAL - Abnormal       Result Value    WBC 10.12      RBC 4.78      Hgb 16.0      Hct 46.3      MCV 96.9      MCH 33.5      MCHC 34.6      RDW 12.9      Platelet 206      MPV 9.0 (*)     NRBC% 0.0     MANUAL DIFFERENTIAL - Abnormal    Neutrophils % 42      Lymphs % 46      Monocytes % 4      Eosinophils % 1      Abnormal Lymphs % 7      Neutrophils Abs Calc 4.2504      Lymphs Abs 4.6552 (*)     Eosinophils Abs 0.1012      Monocytes Abs 0.4048      Platelets Adequate      Anisocytosis Slight (*)     Macrocytosis  Slight (*)     Polychromasia Slight (*)     Stomatocytes Slight (*)    TROPONIN I - Normal    Troponin-I <0.012     NT-PRO NATRIURETIC PEPTIDE - Normal    ProBNP 34.4     D DIMER, QUANTITATIVE - Normal    D-Dimer 0.33     SARS-COV-2 RNA AMPLIFICATION, QUAL - Normal    SARS COV-2 Molecular Negative      Narrative:     The IDNOW COVID-19 assay is a rapid molecular in vitro diagnostic test utilizing an isothermal nucleic acid amplification technology intended for the qualitative detection of nucleic acid from the SARS-CoV-2 viral RNA in direct nasal, nasopharyngeal or throat swabs from individuals who are suspected of COVID-19 by their healthcare provider.   ALCOHOL,MEDICAL (ETHANOL) - Normal    Ethanol Level <10.0      Alcohol, Legal Level <0.010     CBC W/ AUTO DIFFERENTIAL    Narrative:     The following orders were created for panel order CBC auto differential.  Procedure                               Abnormality         Status                     ---------                               -----------         ------                     CBC with Differential[[id number]]       Abnormal            Final result               Manual Differential[[id number]]         Abnormal            Final result                 Please view results for these tests on the individual orders.   COMPREHENSIVE METABOLIC PANEL    Sodium 137      Potassium 3.8      Chloride 105      CO2 25      Glucose 91      Blood Urea Nitrogen 14      Creatinine 0.92      Calcium 9.7      Protein Total 6.9      Albumin 4.4      Globulin 2.5      Albumin/Globulin Ratio 1.8      Bilirubin Total 0.6      ALP 61      ALT 16      AST 21      eGFR >90      Anion Gap 7.0      BUN/Creatinine Ratio 15          ECG Results              EKG 12-lead (Preliminary result)  Result time 02/05/25 04:04:44      Wet Read by Jong Baker MD (02/05/25 04:04:44[facility]    Normal sinus rhythm, heart rate 93, normal  intervals, leftward axis, normal P waves, normal QRS, normal ST segments, normal T-waves, normal QT.                                  Imaging Results              X-Ray Chest AP Portable (Preliminary result)  Result time 02/05/25 04:34:17      Wet Read by Jong Baker MD (02/05/25 04:34:17, [facility] Emergency Medicine)    Normal cardiac silhouette, lungs appear clear                                     Medications   albuterol-ipratropium 2.5 mg-0.5 mg/3 mL nebulizer solution 3 mL (has no administration in time range)   budesonide nebulizer solution 0.5 mg (has no administration in time range)   dexAMETHasone injection 12 mg (12 mg Intravenous Given 2/5/25 0405)     Medical Decision Making  Dyspnea, cough, diaphoresis, swollen legs  Differential diagnosis:  COPD, CHF, PE, ACS, COVID, anemia  Decadron  CHF workup, D-dimer    Patient verbalized symptomatic relief.  Advised the patient stop smoking and take medications as prescribed.    Amount and/or Complexity of Data Reviewed  Labs: ordered.  Radiology: ordered and independent interpretation performed.  Discussion of management or test interpretation with external provider(s): He was told in the past that he had polycythemia vera.  However, his PCP now says he does not have it, and is undergoing no treatment.    Risk  Prescription drug management.                                      Clinical Impression:  Final diagnoses:  [R06.02] Shortness of breath  [J44.1] COPD exacerbation (Primary)          ED Disposition Condition    Discharge Stable          ED Prescriptions       Medication Sig Dispense Start Date End Date Auth. Provider    methylPREDNISolone (MEDROL DOSEPACK) 4 mg tablet use as directed 21 each 2/5/2025 2/26/2025 Arturo Akers DO    albuterol (PROAIR HFA) 90 mcg/actuation inhaler Inhale 2 puffs into the lungs every 6 (six) hours as needed for Wheezing. Rescue 18 g 2/5/2025 2/5/2026 Arturo Akers, DO     albuterol-ipratropium (DUO-NEB) 2.5 mg-0.5 mg/3 mL nebulizer solution Take 3 mLs by nebulization every 6 (six) hours as needed for Wheezing. Rescue 75 mL 2/5/2025 2/5/2026 Arturo Akers, DO          Follow-up Information       Follow up With Specialties Details Why Contact Info    Follow up with the pulmonologist                 Arturo Akers DO  02/05/25 6130

## 2025-03-24 ENCOUNTER — HOSPITAL ENCOUNTER (EMERGENCY)
Facility: HOSPITAL | Age: 60
Discharge: HOME | End: 2025-03-25
Payer: MEDICAID

## 2025-03-24 DIAGNOSIS — R53.1 WEAKNESS: ICD-10-CM

## 2025-03-24 DIAGNOSIS — G40.909 RECURRENT SEIZURES: Primary | ICD-10-CM

## 2025-03-24 LAB
ABS NEUT CALC (OHS): 9.28 X10(3)/MCL (ref 2.1–9.2)
ALBUMIN SERPL-MCNC: 4.5 G/DL (ref 3.4–5)
ALBUMIN/GLOB SERPL: 1.8 RATIO
ALP SERPL-CCNC: 56 UNIT/L (ref 50–144)
ALT SERPL-CCNC: 39 UNIT/L (ref 1–45)
ANION GAP SERPL CALC-SCNC: 20 MEQ/L (ref 2–13)
AST SERPL-CCNC: 37 UNIT/L (ref 17–59)
BILIRUB SERPL-MCNC: 0.8 MG/DL (ref 0–1)
BUN SERPL-MCNC: 10 MG/DL (ref 7–20)
CALCIUM SERPL-MCNC: 9 MG/DL (ref 8.4–10.2)
CHLORIDE SERPL-SCNC: 107 MMOL/L (ref 98–110)
CO2 SERPL-SCNC: 7 MMOL/L (ref 21–32)
CREAT SERPL-MCNC: 0.99 MG/DL (ref 0.66–1.25)
CREAT/UREA NIT SERPL: 10 (ref 12–20)
EOSINOPHIL NFR BLD MANUAL: 0.15 X10(3)/MCL (ref 0–0.9)
EOSINOPHIL NFR BLD MANUAL: 1 % (ref 0–3)
ERYTHROCYTE [DISTWIDTH] IN BLOOD BY AUTOMATED COUNT: 13.8 %
ETHANOL BLD-MCNC: <0.01 G/DL
ETHANOL SERPL-MCNC: <10 MG/DL
FLUAV AG UPPER RESP QL IA.RAPID: NOT DETECTED
FLUBV AG UPPER RESP QL IA.RAPID: NOT DETECTED
GFR SERPLBLD CREATININE-BSD FMLA CKD-EPI: 89 ML/MIN/1.73/M2
GLOBULIN SER-MCNC: 2.5 GM/DL (ref 2–3.9)
GLUCOSE SERPL-MCNC: 149 MG/DL (ref 70–115)
HCT VFR BLD AUTO: 45.4 % (ref 36–52)
HGB BLD-MCNC: 15.1 G/DL (ref 13–18)
LYMPH ABN # BLD MANUAL: 3 %
LYMPHOCYTES NFR BLD MANUAL: 29 % (ref 20–55)
LYMPHOCYTES NFR BLD MANUAL: 4.27 X10(3)/MCL (ref 0.6–4.6)
MAGNESIUM SERPL-MCNC: 1.8 MG/DL (ref 1.8–2.4)
MCH RBC QN AUTO: 33.1 PG (ref 27–34)
MCHC RBC AUTO-ENTMCNC: 33.3 G/DL (ref 31–37)
MCV RBC AUTO: 99.6 FL (ref 79–99)
MONOCYTES NFR BLD MANUAL: 0.59 X10(3)/MCL (ref 0.1–1.3)
MONOCYTES NFR BLD MANUAL: 4 % (ref 0–10)
NEUTROPHILS NFR BLD MANUAL: 63 % (ref 37–73)
NRBC BLD AUTO-RTO: 0 %
PLATELET # BLD AUTO: 164 X10(3)/MCL (ref 140–371)
PLATELET # BLD EST: NORMAL 10*3/UL
PMV BLD AUTO: 10 FL (ref 9.4–12.4)
POTASSIUM SERPL-SCNC: 4 MMOL/L (ref 3.5–5.1)
PROT SERPL-MCNC: 7 GM/DL (ref 6.3–8.2)
RBC # BLD AUTO: 4.56 X10(6)/MCL (ref 4–6)
RBC MORPH BLD: NORMAL
SARS-COV-2 RNA RESP QL NAA+PROBE: NOT DETECTED
SODIUM SERPL-SCNC: 134 MMOL/L (ref 136–145)
WBC # BLD AUTO: 14.73 X10(3)/MCL (ref 4–11.5)

## 2025-03-24 PROCEDURE — 0240U COVID/FLU A&B PCR: CPT

## 2025-03-24 PROCEDURE — 83735 ASSAY OF MAGNESIUM: CPT

## 2025-03-24 PROCEDURE — 82077 ASSAY SPEC XCP UR&BREATH IA: CPT

## 2025-03-24 PROCEDURE — 96375 TX/PRO/DX INJ NEW DRUG ADDON: CPT

## 2025-03-24 PROCEDURE — 85025 COMPLETE CBC W/AUTO DIFF WBC: CPT

## 2025-03-24 PROCEDURE — 93010 ELECTROCARDIOGRAM REPORT: CPT | Mod: ,,, | Performed by: INTERNAL MEDICINE

## 2025-03-24 PROCEDURE — 80053 COMPREHEN METABOLIC PANEL: CPT

## 2025-03-24 PROCEDURE — 93005 ELECTROCARDIOGRAM TRACING: CPT

## 2025-03-24 PROCEDURE — 63600175 PHARM REV CODE 636 W HCPCS

## 2025-03-24 PROCEDURE — 99285 EMERGENCY DEPT VISIT HI MDM: CPT | Mod: 25

## 2025-03-24 RX ORDER — LORAZEPAM 2 MG/ML
2 INJECTION INTRAMUSCULAR
Status: COMPLETED | OUTPATIENT
Start: 2025-03-24 | End: 2025-03-24

## 2025-03-24 RX ORDER — LEVETIRACETAM 500 MG/5ML
2000 INJECTION, SOLUTION, CONCENTRATE INTRAVENOUS
Status: COMPLETED | OUTPATIENT
Start: 2025-03-24 | End: 2025-03-24

## 2025-03-24 RX ORDER — LORAZEPAM 2 MG/ML
1 INJECTION INTRAMUSCULAR
Status: DISCONTINUED | OUTPATIENT
Start: 2025-03-24 | End: 2025-03-24

## 2025-03-24 RX ORDER — LEVETIRACETAM 500 MG/5ML
1000 INJECTION, SOLUTION, CONCENTRATE INTRAVENOUS
Status: DISCONTINUED | OUTPATIENT
Start: 2025-03-24 | End: 2025-03-24

## 2025-03-24 RX ORDER — THIAMINE HYDROCHLORIDE 100 MG/ML
100 INJECTION, SOLUTION INTRAMUSCULAR; INTRAVENOUS
Status: COMPLETED | OUTPATIENT
Start: 2025-03-25 | End: 2025-03-25

## 2025-03-24 RX ORDER — MAGNESIUM SULFATE 1 G/100ML
1 INJECTION INTRAVENOUS
Status: COMPLETED | OUTPATIENT
Start: 2025-03-25 | End: 2025-03-25

## 2025-03-24 RX ADMIN — LORAZEPAM 2 MG: 2 INJECTION INTRAMUSCULAR; INTRAVENOUS at 11:03

## 2025-03-24 RX ADMIN — LEVETIRACETAM 2000 MG: 100 INJECTION, SOLUTION INTRAVENOUS at 11:03

## 2025-03-24 NOTE — Clinical Note
"Aileen Núñezclaudy Martinez was seen and treated in our emergency department on 3/24/2025.  He may return to work on 03/26/2025.       If you have any questions or concerns, please don't hesitate to call.      Jong Baker MD"

## 2025-03-25 VITALS
RESPIRATION RATE: 16 BRPM | SYSTOLIC BLOOD PRESSURE: 111 MMHG | HEART RATE: 83 BPM | DIASTOLIC BLOOD PRESSURE: 81 MMHG | OXYGEN SATURATION: 98 % | TEMPERATURE: 98 F

## 2025-03-25 PROBLEM — G40.909 RECURRENT SEIZURES: Status: ACTIVE | Noted: 2025-03-25

## 2025-03-25 LAB
AMPHET UR QL SCN: NEGATIVE
BACTERIA #/AREA URNS AUTO: NORMAL /HPF
BARBITURATE SCN PRESENT UR: NEGATIVE
BENZODIAZ UR QL SCN: POSITIVE
BILIRUB UR QL STRIP.AUTO: NEGATIVE
CANNABINOIDS UR QL SCN: NEGATIVE
CLARITY UR: CLEAR
COCAINE UR QL SCN: NEGATIVE
COLOR UR AUTO: YELLOW
GLUCOSE UR QL STRIP: NEGATIVE
HGB UR QL STRIP: ABNORMAL
KETONES UR QL STRIP: 15
LEUKOCYTE ESTERASE UR QL STRIP: NEGATIVE
METHADONE UR QL SCN: NEGATIVE
NITRITE UR QL STRIP: NEGATIVE
OHS QRS DURATION: 90 MS
OHS QTC CALCULATION: 472 MS
OPIATES UR QL SCN: NEGATIVE
PCP UR QL: NEGATIVE
PH UR STRIP: 6 [PH]
PH UR: 6 [PH] (ref 5–8)
PROT UR QL STRIP: NEGATIVE
RBC #/AREA URNS AUTO: NORMAL /HPF
SP GR UR STRIP.AUTO: 1.02 (ref 1–1.03)
SQUAMOUS #/AREA URNS AUTO: NORMAL /HPF
UROBILINOGEN UR STRIP-ACNC: 0.2
WBC #/AREA URNS AUTO: NORMAL /HPF

## 2025-03-25 PROCEDURE — 96375 TX/PRO/DX INJ NEW DRUG ADDON: CPT

## 2025-03-25 PROCEDURE — 63600175 PHARM REV CODE 636 W HCPCS

## 2025-03-25 PROCEDURE — 81003 URINALYSIS AUTO W/O SCOPE: CPT

## 2025-03-25 PROCEDURE — 25000003 PHARM REV CODE 250

## 2025-03-25 PROCEDURE — 80307 DRUG TEST PRSMV CHEM ANLYZR: CPT

## 2025-03-25 PROCEDURE — 81015 MICROSCOPIC EXAM OF URINE: CPT

## 2025-03-25 PROCEDURE — 96365 THER/PROPH/DIAG IV INF INIT: CPT

## 2025-03-25 RX ORDER — CHLORDIAZEPOXIDE HYDROCHLORIDE 25 MG/1
25 CAPSULE, GELATIN COATED ORAL 2 TIMES DAILY
Qty: 15 CAPSULE | Refills: 0 | Status: SHIPPED | OUTPATIENT
Start: 2025-03-25 | End: 2025-04-02

## 2025-03-25 RX ADMIN — THIAMINE HYDROCHLORIDE 100 MG: 100 INJECTION, SOLUTION INTRAMUSCULAR; INTRAVENOUS at 12:03

## 2025-03-25 RX ADMIN — SODIUM CHLORIDE 1000 ML: 9 INJECTION, SOLUTION INTRAVENOUS at 12:03

## 2025-03-25 RX ADMIN — MAGNESIUM SULFATE HEPTAHYDRATE 1 G: 1 INJECTION, SOLUTION INTRAVENOUS at 12:03

## 2025-03-25 NOTE — DISCHARGE INSTRUCTIONS
Take the Librium as prescribed.  This medicine helps for any alcohol withdrawal.  Continue taking her other medications.  Call your doctor today.   No

## 2025-03-25 NOTE — ED PROVIDER NOTES
"Encounter Date: 3/24/2025       History     Chief Complaint   Patient presents with    Seizures     Arrives via [facility] with c/o seizure like activity. EMS reports active seizing during at time of pickup, post ictal and agitated during triage.      59-year-old male arrives actively seizing.  EMS reports that he called the ambulance complaining of a "just feeling bad all day".  By medical records, he has a history of seizures and alcoholism.  He is supposed to be taking Depakote.    The history is provided by the EMS personnel and medical records.     Review of patient's allergies indicates:  No Known Allergies  Past Medical History:   Diagnosis Date    Brain tumor     COPD (chronic obstructive pulmonary disease)     Seizures      History reviewed. No pertinent surgical history.  No family history on file.  Social History[1]  Review of Systems   Constitutional:  Negative for fever.        "feeling bad".   HENT:  Negative for sore throat.    Respiratory:  Negative for shortness of breath.    Cardiovascular:  Negative for chest pain.   Gastrointestinal:  Negative for nausea.   Genitourinary:  Negative for dysuria.   Musculoskeletal:  Negative for back pain.   Skin:  Negative for rash.   Neurological:  Positive for seizures. Negative for weakness.   Hematological:  Does not bruise/bleed easily.   All other systems reviewed and are negative.      Physical Exam     Initial Vitals [03/24/25 2345]   BP Pulse Resp Temp SpO2   (!) 121/90 95 17 98 °F (36.7 °C) 95 %      MAP       --         Physical Exam    Nursing note and vitals reviewed.  Constitutional: Vital signs are normal. He appears well-developed and well-nourished. He is cooperative.   HENT:   Head: Normocephalic and atraumatic.   There is blood around his lips   Eyes: Conjunctivae, EOM and lids are normal. Pupils are equal, round, and reactive to light.   Neck: Trachea normal. Neck supple.   Normal range of motion.  Cardiovascular:  Normal rate, regular " rhythm, normal heart sounds and intact distal pulses.           Pulmonary/Chest: Breath sounds normal.   Abdominal: Abdomen is soft. Bowel sounds are normal.   Musculoskeletal:         General: Normal range of motion.      Cervical back: Normal, normal range of motion and neck supple.      Lumbar back: Normal.     Neurological: He is alert. He has normal strength. Coordination normal.   After the seizure he has postictal confusion, and is difficult to direct.   Skin: Skin is warm, dry and intact. Capillary refill takes less than 2 seconds.   Psychiatric: His speech is normal. Cognition and memory are normal.         ED Course   Procedures  Labs Reviewed   COMPREHENSIVE METABOLIC PANEL - Abnormal       Result Value    Sodium 134 (*)     Potassium 4.0      Chloride 107      CO2 7 (*)     Glucose 149 (*)     Blood Urea Nitrogen 10      Creatinine 0.99      Calcium 9.0      Protein Total 7.0      Albumin 4.5      Globulin 2.5      Albumin/Globulin Ratio 1.8      Bilirubin Total 0.8      ALP 56      ALT 39      AST 37      eGFR 89      Anion Gap 20.0 (*)     BUN/Creatinine Ratio 10 (*)    CBC WITH DIFFERENTIAL - Abnormal    WBC 14.73 (*)     RBC 4.56      Hgb 15.1      Hct 45.4      MCV 99.6 (*)     MCH 33.1      MCHC 33.3      RDW 13.8      Platelet 164      MPV 10.0      NRBC% 0.0     URINALYSIS, REFLEX TO URINE CULTURE - Abnormal    Color, UA Yellow      Appearance, UA Clear      Specific Gravity, UA 1.025      pH, UA 6.0      Protein, UA Negative      Glucose, UA Negative      Ketones, UA 15 (*)     Blood, UA Small (*)     Bilirubin, UA Negative      Urobilinogen, UA 0.2      Nitrites, UA Negative      Leukocyte Esterase, UA Negative      Narrative:      URINE STABILITY IS 2 HOURS AT ROOM TEMP OR    SIX HOURS REFRIGERATED. PERFORMING TESTING ON    SPECIMENS GREATER THAN THIS AGE MAY AFFECT THE    FOLLOWING TESTS:    PH          SPECIFIC GRAVITY           BLOOD    CLARITY     BILIRUBIN               UROBILINOGEN    DRUG SCREEN, URINE (BEAKER) - Abnormal    Amphetamines, Urine Negative      Barbiturates, Urine Negative      Benzodiazepine, Urine Positive (*)     Cannabinoids, Urine Negative      Cocaine, Urine Negative      Opiates, Urine Negative      Phencyclidine, Urine Negative      Methadone, Urine Negative      pH, Urine 6.0      Narrative:     Cut off concentrations:    Amphetamines - 1000 ng/ml  Barbiturates - 200 ng/ml  Benzodiazepine - 200 ng/ml  Cannabinoids (THC) - 50 ng/ml  Cocaine - 300 ng/ml  Fentanyl - 1.0 ng/ml  MDMA - 500 ng/ml  Opiates - 300 ng/ml   Phencyclidine (PCP) - 25 ng/ml  Methadone - 300 ng/ml      False negatives may result form substances such as bleach added to urine.  False positives may result for the presence of a substance with similar chemical structure to the drug or its metabolite.    This test provides only a PRELIMINARY analytical test result. A more specific alternate chemical method must be used in order to obtain a confirmed analytical result. Gas chromatography/mass spectrometry (GC/MS) is the preferred confirmatory method. Other chemical confirmation methods are available. Clinical consideration and professional judgement should be applied to any drug of abuse test result, particularly when preliminary positive results are used.    Positive results will be confirmed only at the physicians request. Unconfirmed screening results are to be used only for medical purposes (treatment).          MANUAL DIFFERENTIAL - Abnormal    Neutrophils % 63      Lymphs % 29      Monocytes % 4      Eosinophils % 1      Abnormal Lymphs % 3      Neutrophils Abs Calc 9.2799 (*)     Lymphs Abs 4.2717      Eosinophils Abs 0.1473      Monocytes Abs 0.5892      Platelets Normal      RBC Morph Normal     MAGNESIUM - Normal    Magnesium Level 1.80     COVID/FLU A&B PCR - Normal    Influenza A PCR Not Detected      Influenza B PCR Not Detected      SARS-CoV-2 PCR Not Detected      Narrative:     The Xpert  Xpress SARS-CoV-2/FLU/RSV plus is a rapid, multiplexed real-time PCR test intended for the simultaneous qualitative detection and differentiation of SARS-CoV-2, Influenza A, Influenza B, and respiratory syncytial virus (RSV) viral RNA in either nasopharyngeal swab or nasal swab specimens.         ALCOHOL,MEDICAL (ETHANOL) - Normal    Ethanol Level <10.0      Alcohol, Legal Level <0.010     URINALYSIS, MICROSCOPIC - Normal    Bacteria, UA None Seen      RBC, UA 0-2      WBC, UA 0-2      Squamous Epithelial Cells, UA None Seen     CBC W/ AUTO DIFFERENTIAL    Narrative:     The following orders were created for panel order CBC auto differential.  Procedure                               Abnormality         Status                     ---------                               -----------         ------                     CBC with Differential[[id number]]       Abnormal            Final result               Manual Differential[[id number]]         Abnormal            Final result                 Please view results for these tests on the individual orders.   VALPROIC ACID LEVEL, FREE        ECG Results              EKG 12-lead (Preliminary result)  Result time 03/24/25 23:46:43      Wet Read by Jong Baker MD (03/24/25 23:46:43, [facility])    Normal sinus rhythm, heart rate 100, normal intervals, normal axis, normal P waves, normal QRS, normal ST segments, normal T-waves, normal QT.                                  Imaging Results              CT Head Without Contrast (Preliminary result)  Result time 03/25/25 00:12:08      Wet Read by Jong Baker MD (03/25/25 00:12:08, [facility] Emergency [facility])                          Wet Read by Jong Baker MD (03/24/25 23:57:22, [facility])    Normal cardiac silhouette, clear lung fields                      Preliminary result by Tierney  Angel ROCHA MD (03/24/25 23:49:24)                   Narrative:    START OF REPORT:  Technique: CT of the head was performed without intravenous contrast with axial as well as coronal and sagittal images.    Comparison: Comparison is with study dated 2023-03-01 05:39:05.    Dosage Information: Automated exposure control was utilized.    Clinical history: SEIZURE.    Findings:  Hemorrhage: No acute intracranial hemorrhage is seen.  CSF spaces: The ventricles sulci and basal cisterns are within normal limits.  Brain parenchyma: There is preservation of the grey white junction throughout. There is a stable appearing subtle hyperdense focus measuring 1 cm in the posterior left temporal lobe seen on series 7 image 17. This is suggestive of a likely benign lesion such as a vascular malformation versus benign mass lesion.  Cerebellum: Unremarkable.  Sella and skull base: The sella appears to be within normal limits for age.  Intracranial calcifications: Incidental note is made of bilateral choroid plexus calcification. Incidental note is made of some pineal region calcification.  Calvarium: No acute linear or depressed skull fracture is seen.    Maxillofacial Structures:  Paranasal sinuses: The visualized paranasal sinuses appear clear with no mucoperiosteal thickening or air fluid levels identified.  Orbits: The orbits appear unremarkable.  Zygomatic arches: The zygomatic arches are intact and unremarkable.  Temporal bones and mastoids: The temporal bones and mastoids appear unremarkable.  TMJ: The mandibular condyles appear normally placed with respect to the mandibular fossa.      Impression:  1. There is a stable appearing subtle hyperdense focus measuring 1 cm in the posterior left temporal lobe seen on series 7 image 17. This is suggestive of a likely benign lesion such as a vascular malformation versus benign mass lesion. Correlate clinically as regards additional evaluation and follow up.  2. No acute intracranial  process identified. Details and other findings as noted above.                                         X-Ray Chest AP Portable (Preliminary result)  Result time 03/25/25 00:12:16      Wet Read by Jong Baker MD (03/25/25 00:12:16, [facility])    Normal cardiac silhouette, clear lung fields                                     Medications   LORazepam injection 2 mg (2 mg Intravenous Given 3/24/25 2306)   levETIRAcetam injection 2,000 mg (2,000 mg Intravenous Given 3/24/25 2306)   thiamine injection 100 mg (100 mg Intravenous Given 3/25/25 0039)   magnesium sulfate in dextrose IVPB (premix) 1 g (0 g Intravenous Stopped 3/25/25 0140)   sodium chloride 0.9% bolus 1,000 mL 1,000 mL (0 mLs Intravenous Stopped 3/25/25 0140)     Medical Decision Making  Seizure with a postictal confusion, generalized bad feeling earlier  Differential diagnosis:  Breakthrough seizure, alcohol withdrawal, substance abuse, dehydration, electrolyte imbalance, occult infection, CVA  Ativan, Keppra  CT head, chest x-ray, EKG, labs    Amount and/or Complexity of Data Reviewed  Labs: ordered. Decision-making details documented in ED Course.  Radiology: ordered and independent interpretation performed. Decision-making details documented in ED Course.    Risk  Prescription drug management.               ED Course as of 03/25/25 0213   Mon Mar 24, 2025   2357 CT Head Without Contrast  No acute abnormality [TM]   2357 Comprehensive metabolic panel(!!)  Metabolic acidosis, consistent with recent seizure activity [TM]      ED Course User Index  [TM] Jong Baker MD                           Clinical Impression:  Final diagnoses:  [R53.1] Weakness  [G40.909] Recurrent seizures (Primary)          ED Disposition Condition    Discharge Good          ED Prescriptions       Medication Sig Dispense Start Date End Date Auth. Provider    chlordiazepoxide (LIBRIUM) 25 MG Cap Take 1 capsule (25 mg total)  by mouth 2 (two) times a day. Day 1: 50mg qid  Day 2: 25mg qid  Day 3: 25mg bid  Day 4: 25mg at bedtime for 15 doses 15 capsule 3/25/2025 4/2/2025 Jong Baker MD          Follow-up Information       Follow up With Specialties Details Why Contact Info    John Tam MD Pediatrics Call today  [address] [facility]  [address]  [facility] [address]  [phone]                 [1]   Social History  Tobacco Use    Smoking status: Every Day     Current packs/day: 2.00     Types: Cigarettes    Smokeless tobacco: Never   Substance Use Topics    Alcohol use: Yes     Alcohol/week: 9.0 standard drinks of alcohol     Types: 9 Shots of liquor per week    Drug use: Never        Jong Baker MD  03/25/25 0214

## 2025-03-27 LAB — VALPROATE FREE SERPL-MCNC: 9 MCG/ML (ref 5–25)
